# Patient Record
Sex: FEMALE | Race: WHITE | NOT HISPANIC OR LATINO | Employment: FULL TIME | URBAN - METROPOLITAN AREA
[De-identification: names, ages, dates, MRNs, and addresses within clinical notes are randomized per-mention and may not be internally consistent; named-entity substitution may affect disease eponyms.]

---

## 2020-08-05 PROBLEM — O22.30 DVT (DEEP VEIN THROMBOSIS) IN PREGNANCY: Status: ACTIVE | Noted: 2020-08-05

## 2020-08-19 PROBLEM — I87.1 MAY-THURNER SYNDROME: Status: ACTIVE | Noted: 2020-08-19

## 2021-02-18 ENCOUNTER — NURSE TRIAGE (OUTPATIENT)
Dept: ADMINISTRATIVE | Facility: CLINIC | Age: 76
End: 2021-02-18

## 2021-02-25 PROBLEM — R06.09 DOE (DYSPNEA ON EXERTION): Status: ACTIVE | Noted: 2021-02-25

## 2021-02-25 PROBLEM — I25.10 CAD IN NATIVE ARTERY: Status: ACTIVE | Noted: 2021-02-25

## 2021-02-25 PROBLEM — R94.39 ABNORMAL MYOCARDIAL PERFUSION STUDY: Status: ACTIVE | Noted: 2021-02-25

## 2024-06-04 ENCOUNTER — TELEPHONE (OUTPATIENT)
Dept: NEUROSURGERY | Facility: CLINIC | Age: 79
End: 2024-06-04
Payer: MEDICARE

## 2024-06-04 NOTE — TELEPHONE ENCOUNTER
Spoke w/ pt. Pt stated that she already scheduled appt w/ provider to discuss SCS placement. Pt stated that she did a trial w/ [employer]. Referral in media.     ----- Message from Lisa Rizo sent at 6/4/2024  2:03 PM CDT -----  Contact: [phone]  PATIENTCALL     Pt is calling to speak with someone in provider office regarding she has a referral in epic that she is ready to schedule. She is asking for a return call please call.

## 2024-06-18 ENCOUNTER — TELEPHONE (OUTPATIENT)
Dept: NEUROSURGERY | Facility: CLINIC | Age: 79
End: 2024-06-18
Payer: MEDICARE

## 2024-06-18 NOTE — TELEPHONE ENCOUNTER
Spoke w/ pt regarding rescheduling appt to discuss SCS implant. Pt stated that she cancelled previous appt bc she was told [facility]. Advised pt that system is allowing me to schedule appt so her insurance should be accepted here, but she should call her insurance to double check. Pt stated that someone from our office called referring doctors office (Dr. Mckeon) to tell them that we do not accept her insurance. No documentation in chart. Advised pt again that she might need to call insurance to double check. Pt declined stating she does not want to reschedule.